# Patient Record
Sex: MALE | Race: WHITE | URBAN - METROPOLITAN AREA
[De-identification: names, ages, dates, MRNs, and addresses within clinical notes are randomized per-mention and may not be internally consistent; named-entity substitution may affect disease eponyms.]

---

## 2022-02-09 ENCOUNTER — OFFICE VISIT (OUTPATIENT)
Dept: URBAN - METROPOLITAN AREA CLINIC 56 | Facility: CLINIC | Age: 77
End: 2022-02-09
Payer: COMMERCIAL

## 2022-02-09 DIAGNOSIS — I63.9 STROKE: ICD-10-CM

## 2022-02-09 DIAGNOSIS — Z96.1 PRESENCE OF INTRAOCULAR LENS: ICD-10-CM

## 2022-02-09 DIAGNOSIS — H53.453: ICD-10-CM

## 2022-02-09 DIAGNOSIS — H53.8 OTHER VISUAL DISTURBANCES: ICD-10-CM

## 2022-02-09 DIAGNOSIS — H52.4 PRESBYOPIA: ICD-10-CM

## 2022-02-09 DIAGNOSIS — H53.123 TRANSIENT VISUAL LOSS, BILATERAL: Primary | ICD-10-CM

## 2022-02-09 DIAGNOSIS — H43.812 VITREOUS DEGENERATION, LEFT EYE: ICD-10-CM

## 2022-02-09 DIAGNOSIS — H26.492 OTHER SECONDARY CATARACT, LEFT EYE: ICD-10-CM

## 2022-02-09 PROCEDURE — 92134 CPTRZ OPH DX IMG PST SGM RTA: CPT | Performed by: OPTOMETRIST

## 2022-02-09 PROCEDURE — 92083 EXTENDED VISUAL FIELD XM: CPT | Performed by: OPTOMETRIST

## 2022-02-09 PROCEDURE — 99204 OFFICE O/P NEW MOD 45 MIN: CPT | Performed by: OPTOMETRIST

## 2022-02-09 ASSESSMENT — INTRAOCULAR PRESSURE
OS: 11
OD: 11

## 2022-02-09 ASSESSMENT — KERATOMETRY
OS: 43.98
OD: 44.06

## 2022-02-09 ASSESSMENT — VISUAL ACUITY
OS: 20/30
OD: 20/25

## 2022-02-09 NOTE — IMPRESSION/PLAN
Impression: Other localized bilateral visual field defects: H53.453. 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 Patient also notes that his vision is dark - wears head lamp to see well enough to ambulate. Patient has been seen by neuro-ophthalmology at [facility]. Cause of this is was not able to be determined. Recommend consult with [facility] at [facility]. Will send fax over.  Gave patient information regarding [facility] program.